# Patient Record
(demographics unavailable — no encounter records)

---

## 2025-04-29 NOTE — ASSESSMENT
[FreeTextEntry1] : 1) got flu vacc at work.  Reminded updated covid vacc - he will consider.  Also needs a prevnar - wants upcoming visits.  2) DM - getting A1C with rest of labs he's due for; hasn't had microalb check in some time, including in order.  Reminded ophtho.   3) has significant UYEN, rooted in trauma in past and in recent losses.  Clonazepam RN, but pushed for idea of a maintenance more today.  Would like to trial bupropion given need to lose weight, ease food intake (along with his GLP1), and recent stall in weight loss on his mounjaro.  Will titrate mounjaro based on labs.  34 minutes were spent in preparation of this visit, including review of previous notes and test results, interview and examination of patient, contact with other care contributors, discussion of plan, arranging for appropriate testing and treatment, and documentation.

## 2025-04-29 NOTE — PHYSICAL EXAM
[No Acute Distress] : no acute distress [Well Nourished] : well nourished [Well Developed] : well developed [Well-Appearing] : well-appearing [No JVD] : no jugular venous distention [Supple] : supple [No Respiratory Distress] : no respiratory distress  [No Accessory Muscle Use] : no accessory muscle use [Clear to Auscultation] : lungs were clear to auscultation bilaterally [Normal Percussion] : the chest was normal to percussion [Normal Rate] : normal rate  [Regular Rhythm] : with a regular rhythm [Normal S1, S2] : normal S1 and S2 [No Murmur] : no murmur heard [No Edema] : there was no peripheral edema [No Extremity Clubbing/Cyanosis] : no extremity clubbing/cyanosis [Grossly Normal Strength/Tone] : grossly normal strength/tone
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
## 2025-04-29 NOTE — REVIEW OF SYSTEMS
[Insomnia] : no insomnia [Anxiety] : anxiety [Depression] : no depression [Negative] : Gastrointestinal [FreeTextEntry9] : see hpi  [de-identified] : see hpi